# Patient Record
Sex: MALE | Race: BLACK OR AFRICAN AMERICAN | NOT HISPANIC OR LATINO
[De-identification: names, ages, dates, MRNs, and addresses within clinical notes are randomized per-mention and may not be internally consistent; named-entity substitution may affect disease eponyms.]

---
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
## 2024-09-10 NOTE — ED PROVIDER NOTE - CONSTITUTIONAL, MLM
normal (ped)... In no apparent distress, appears well developed and well nourished. Pediatric bleeding questionnaire performed which was negative for any personal or family bleeding concerns.

## 2025-05-16 NOTE — ED PROVIDER NOTE - CARE PLAN
Principal Discharge DX:	Hip strain, right, initial encounter Principal Discharge DX:	Hip strain, right, initial encounter  Assessment and plan of treatment:	You were seen in the Emergency Department for hip pain. Your examination and xray were reassuring. Please follow up with orthopedic surgery as soon as possible for further evaluation. Take motrin and tylenol as needed for pain. Please return to the Emergency Department if you have any new concerning symptoms such as severe pain, weakness, or any other concerning symptoms. operating room